# Patient Record
Sex: MALE | Race: WHITE | NOT HISPANIC OR LATINO | Employment: FULL TIME | URBAN - METROPOLITAN AREA
[De-identification: names, ages, dates, MRNs, and addresses within clinical notes are randomized per-mention and may not be internally consistent; named-entity substitution may affect disease eponyms.]

---

## 2023-04-24 ENCOUNTER — OFFICE VISIT (OUTPATIENT)
Dept: FAMILY MEDICINE CLINIC | Facility: CLINIC | Age: 23
End: 2023-04-24
Payer: COMMERCIAL

## 2023-04-24 VITALS
HEIGHT: 67 IN | BODY MASS INDEX: 26.84 KG/M2 | OXYGEN SATURATION: 98 % | DIASTOLIC BLOOD PRESSURE: 72 MMHG | WEIGHT: 171 LBS | RESPIRATION RATE: 18 BRPM | SYSTOLIC BLOOD PRESSURE: 122 MMHG | HEART RATE: 62 BPM

## 2023-04-24 DIAGNOSIS — Z11.3 ROUTINE SCREENING FOR STI (SEXUALLY TRANSMITTED INFECTION): ICD-10-CM

## 2023-04-24 DIAGNOSIS — Z72.0 TOBACCO ABUSE: ICD-10-CM

## 2023-04-24 DIAGNOSIS — F41.9 ANXIETY: ICD-10-CM

## 2023-04-24 DIAGNOSIS — Z13.220 SCREENING, LIPID: ICD-10-CM

## 2023-04-24 DIAGNOSIS — Z13.228 SCREENING FOR METABOLIC DISORDER: ICD-10-CM

## 2023-04-24 DIAGNOSIS — M41.115 JUVENILE IDIOPATHIC SCOLIOSIS OF THORACOLUMBAR REGION: ICD-10-CM

## 2023-04-24 DIAGNOSIS — Z11.59 ENCOUNTER FOR HEPATITIS C SCREENING TEST FOR LOW RISK PATIENT: ICD-10-CM

## 2023-04-24 DIAGNOSIS — Z00.00 PREVENTATIVE HEALTH CARE: Primary | ICD-10-CM

## 2023-04-24 DIAGNOSIS — D22.9 ATYPICAL MOLE: ICD-10-CM

## 2023-04-24 DIAGNOSIS — R00.1 BRADYCARDIA: ICD-10-CM

## 2023-04-24 RX ORDER — BUPROPION HYDROCHLORIDE 100 MG/1
100 TABLET, EXTENDED RELEASE ORAL 2 TIMES DAILY
Qty: 60 TABLET | Refills: 0 | Status: SHIPPED | OUTPATIENT
Start: 2023-04-24

## 2023-04-24 NOTE — PROGRESS NOTES
Subjective   Ariel Myers is a 23 y.o. male.     History of Present Illness   New pt here to estab primary care. Due annual, labs, updated screenings, also has acute concerns     Here for concerns with bradycardia. He was told at clinic when he got tetanus shot his HR was 47.   No syncope, no falls, no h.o seizure. He does have history of murmur. No chest pain, palpitations. HR at rest 58-62.     Chronic tobacco abuse x years, he reports at times it feel hard to take a deep breath. He has tried to cut back, has quit in the past x 7 months. He is interested in trying to quit. History of substance abuse, clean x 3 years    Chronic anxiety x years. He thinks he has ADHD and trouble focusing. Denies SI/HI  PHQ-2 Depression Screening  Little interest or pleasure in doing things? 0-->not at all   Feeling down, depressed, or hopeless? 0-->not at all   PHQ-2 Total Score 0     Chronic scoliosis x years. Chronic back pain, hip pin. Does not wear brace. Does not follow w spine, would like to see PT.     The following portions of the patient's history were reviewed and updated as appropriate: allergies, current medications, past family history, past medical history, past social history, past surgical history and problem list.    Review of Systems   Constitutional: Negative for activity change, diaphoresis, fatigue, fever and unexpected weight loss.   HENT: Negative for congestion.         Awaiting dental exam    Eyes: Negative for visual disturbance.        Glasses, eye exam is due      Respiratory: Negative for cough, shortness of breath and wheezing.    Cardiovascular: Negative for chest pain, palpitations and leg swelling.   Gastrointestinal: Negative for abdominal distention, constipation, diarrhea and GERD.   Endocrine: Negative for cold intolerance, heat intolerance, polydipsia, polyphagia and polyuria.   Genitourinary: Negative for discharge, dysuria, erectile dysfunction, testicular pain and urinary incontinence.    Musculoskeletal: Positive for arthralgias and back pain.   Skin: Negative for bruise.   Allergic/Immunologic: Negative for environmental allergies.   Neurological: Negative for seizures, syncope, weakness and headache.   Hematological: Does not bruise/bleed easily.   Psychiatric/Behavioral: Positive for positive for hyperactivity. Negative for sleep disturbance and depressed mood. The patient is nervous/anxious.        Objective   Physical Exam  Vitals reviewed.   Constitutional:       Appearance: Normal appearance. He is normal weight.   HENT:      Head: Normocephalic.      Right Ear: Tympanic membrane normal.      Left Ear: Tympanic membrane normal.      Nose: Nose normal.      Mouth/Throat:      Mouth: Mucous membranes are moist.   Eyes:      Pupils: Pupils are equal, round, and reactive to light.   Cardiovascular:      Rate and Rhythm: Regular rhythm. Bradycardia present.      Pulses: Normal pulses.      Heart sounds: Murmur heard.   Pulmonary:      Effort: Pulmonary effort is normal.      Breath sounds: Normal breath sounds.   Abdominal:      General: Abdomen is flat. Bowel sounds are normal.      Palpations: Abdomen is soft.   Musculoskeletal:         General: Normal range of motion.      Cervical back: Normal range of motion.        Back:       Comments: Scoliosis    Skin:     General: Skin is warm.   Neurological:      General: No focal deficit present.      Mental Status: He is alert.   Psychiatric:         Mood and Affect: Mood is anxious.         Vitals:    04/24/23 0903   BP: 122/72   Pulse: 62   Resp: 18   SpO2: 98%     Body mass index is 26.78 kg/m².      ECG 12 Lead    Date/Time: 4/24/2023 1:15 PM  Performed by: Dariela Hanley APRN  Authorized by: Dariela Hanley APRN   Comparison: not compared with previous ECG   Previous ECG: no previous ECG available  Rhythm: sinus bradycardia    Clinical impression: abnormal EKG  Comments: Reviewed w Dr Bowens             Assessment & Plan   Problems  Addressed this Visit    None  Visit Diagnoses     Preventative health care    -  Primary    Bradycardia        Relevant Orders    ECG 12 Lead    Screening for metabolic disorder        Relevant Orders    Comprehensive metabolic panel    TSH    CBC w AUTO Differential    Screening, lipid        Relevant Orders    Lipid Panel w/ Chol/HDL Ratio    Encounter for hepatitis C screening test for low risk patient        Routine screening for STI (sexually transmitted infection)        Relevant Orders    Chlamydia trachomatis, Neisseria gonorrhoeae, PCR - Urine, Urine, Random Void    Hepatitis Panel, Acute    HIV-1 / O / 2 Ag / Antibody 4th Generation    RPR, Rfx Qn RPR / Confirm TP    Tobacco abuse        Anxiety        Juvenile idiopathic scoliosis of thoracolumbar region        Relevant Orders    Ambulatory Referral to Physical Therapy    Atypical mole        Relevant Orders    Ambulatory Referral to Dermatology (Completed)      Diagnoses       Codes Comments    Preventative health care    -  Primary ICD-10-CM: Z00.00  ICD-9-CM: V70.0     Bradycardia     ICD-10-CM: R00.1  ICD-9-CM: 427.89     Screening for metabolic disorder     ICD-10-CM: Z13.228  ICD-9-CM: V77.99     Screening, lipid     ICD-10-CM: Z13.220  ICD-9-CM: V77.91     Encounter for hepatitis C screening test for low risk patient     ICD-10-CM: Z11.59  ICD-9-CM: V73.89     Routine screening for STI (sexually transmitted infection)     ICD-10-CM: Z11.3  ICD-9-CM: V74.5     Tobacco abuse     ICD-10-CM: Z72.0  ICD-9-CM: 305.1     Anxiety     ICD-10-CM: F41.9  ICD-9-CM: 300.00     Juvenile idiopathic scoliosis of thoracolumbar region     ICD-10-CM: M41.115  ICD-9-CM: 737.30     Atypical mole     ICD-10-CM: D22.9  ICD-9-CM: 216.9         Orders Placed This Encounter   Procedures   • Chlamydia trachomatis, Neisseria gonorrhoeae, PCR - Urine, Urine, Random Void     Order Specific Question:   Release to patient     Answer:   Routine Release   • Lipid Panel w/ Chol/HDL  Ratio     Order Specific Question:   Release to patient     Answer:   Routine Release   • Comprehensive metabolic panel     Order Specific Question:   Release to patient     Answer:   Routine Release   • TSH     Order Specific Question:   Release to patient     Answer:   Routine Release   • Hepatitis Panel, Acute     Order Specific Question:   Release to patient     Answer:   Routine Release   • HIV-1 / O / 2 Ag / Antibody 4th Generation     Order Specific Question:   Release to patient     Answer:   Routine Release   • RPR, Rfx Qn RPR / Confirm TP     Order Specific Question:   Release to patient     Answer:   Routine Release   • Ambulatory Referral to Physical Therapy     Referral Priority:   Routine     Referral Type:   Physical Therapy     Referral Reason:   Specialty Services Required     Requested Specialty:   Physical Therapy     Number of Visits Requested:   1   • Ambulatory Referral to Dermatology     Referral Priority:   Routine     Referral Type:   Consultation     Referral Reason:   Specialty Services Required     Requested Specialty:   Dermatology     Number of Visits Requested:   1   • ECG 12 Lead     Order Specific Question:   Reason for Exam:     Answer:   h.o murmur and bradycardia   • CBC w AUTO Differential     Order Specific Question:   Manual Differential     Answer:   No       Preventative care- Follow heart healthy diet, drink water, walk daily. Wear seatbelts, wear helmets, wear sunscreens. Follow CDC guidelines for covid pandemic.     Bradycardia-EKG done, reviewed with patient.  If you have syncope or lightheadedness or heart rate consistently running in the 40s to 50s, notify provider.  Slow position changes, stay hydrated     Scoliosis- refer PT    Mole change- refer derm, wear sunscreen    Tobacco use/anxiety-try Wellbutrin 100 SR twice daily, encourage smoking cessation and pick a quit date May call [phone] quit now for additional supports  Reviewed medication side effect profile, ER or  call [phone] for suicidality or worsening symptoms    STI panel collected-reviewed safe sex practices, call with acute concerns       Additional time on acute concerns greater than 24 minutes  Education provided in AVS   Return in about 1 year (around 4/24/2024) for Annual physical.

## 2023-04-26 LAB
ALBUMIN SERPL-MCNC: 4.5 G/DL (ref 4.1–5.2)
ALBUMIN/GLOB SERPL: 2 {RATIO} (ref 1.2–2.2)
ALP SERPL-CCNC: 100 IU/L (ref 44–121)
ALT SERPL-CCNC: 12 IU/L (ref 0–44)
AST SERPL-CCNC: 14 IU/L (ref 0–40)
BASOPHILS # BLD AUTO: 0.1 X10E3/UL (ref 0–0.2)
BASOPHILS NFR BLD AUTO: 1 %
BILIRUB SERPL-MCNC: 0.2 MG/DL (ref 0–1.2)
BUN SERPL-MCNC: 18 MG/DL (ref 6–20)
BUN/CREAT SERPL: 16 (ref 9–20)
C TRACH RRNA SPEC QL NAA+PROBE: NEGATIVE
CALCIUM SERPL-MCNC: 9.9 MG/DL (ref 8.7–10.2)
CHLORIDE SERPL-SCNC: 106 MMOL/L (ref 96–106)
CHOLEST SERPL-MCNC: 145 MG/DL (ref 100–199)
CHOLEST/HDLC SERPL: 3.9 RATIO (ref 0–5)
CO2 SERPL-SCNC: 25 MMOL/L (ref 20–29)
CREAT SERPL-MCNC: 1.13 MG/DL (ref 0.76–1.27)
EGFRCR SERPLBLD CKD-EPI 2021: 93 ML/MIN/1.73
EOSINOPHIL # BLD AUTO: 0.3 X10E3/UL (ref 0–0.4)
EOSINOPHIL NFR BLD AUTO: 3 %
ERYTHROCYTE [DISTWIDTH] IN BLOOD BY AUTOMATED COUNT: 12.6 % (ref 11.6–15.4)
GLOBULIN SER CALC-MCNC: 2.2 G/DL (ref 1.5–4.5)
GLUCOSE SERPL-MCNC: 114 MG/DL (ref 70–99)
HAV IGM SERPL QL IA: NEGATIVE
HBV CORE IGM SERPL QL IA: NEGATIVE
HBV SURFACE AG SERPL QL IA: NEGATIVE
HCT VFR BLD AUTO: 51.1 % (ref 37.5–51)
HCV AB SERPL QL IA: NORMAL
HCV IGG SERPL QL IA: NON REACTIVE
HDLC SERPL-MCNC: 37 MG/DL
HGB BLD-MCNC: 16.9 G/DL (ref 13–17.7)
HIV 1+2 AB+HIV1 P24 AG SERPL QL IA: NON REACTIVE
IMM GRANULOCYTES # BLD AUTO: 0 X10E3/UL (ref 0–0.1)
IMM GRANULOCYTES NFR BLD AUTO: 0 %
LDLC SERPL CALC-MCNC: 94 MG/DL (ref 0–99)
LYMPHOCYTES # BLD AUTO: 2.6 X10E3/UL (ref 0.7–3.1)
LYMPHOCYTES NFR BLD AUTO: 26 %
MCH RBC QN AUTO: 29.5 PG (ref 26.6–33)
MCHC RBC AUTO-ENTMCNC: 33.1 G/DL (ref 31.5–35.7)
MCV RBC AUTO: 89 FL (ref 79–97)
MONOCYTES # BLD AUTO: 0.6 X10E3/UL (ref 0.1–0.9)
MONOCYTES NFR BLD AUTO: 6 %
N GONORRHOEA RRNA SPEC QL NAA+PROBE: NEGATIVE
NEUTROPHILS # BLD AUTO: 6.4 X10E3/UL (ref 1.4–7)
NEUTROPHILS NFR BLD AUTO: 64 %
PLATELET # BLD AUTO: 269 X10E3/UL (ref 150–450)
POTASSIUM SERPL-SCNC: 4.6 MMOL/L (ref 3.5–5.2)
PROT SERPL-MCNC: 6.7 G/DL (ref 6–8.5)
RBC # BLD AUTO: 5.72 X10E6/UL (ref 4.14–5.8)
RPR SER QL: NON REACTIVE
SODIUM SERPL-SCNC: 145 MMOL/L (ref 134–144)
TRIGL SERPL-MCNC: 73 MG/DL (ref 0–149)
TSH SERPL DL<=0.005 MIU/L-ACNC: 2.14 UIU/ML (ref 0.45–4.5)
VLDLC SERPL CALC-MCNC: 14 MG/DL (ref 5–40)
WBC # BLD AUTO: 10 X10E3/UL (ref 3.4–10.8)

## 2023-05-30 RX ORDER — BUPROPION HYDROCHLORIDE 100 MG/1
TABLET, EXTENDED RELEASE ORAL
Qty: 60 TABLET | Refills: 0 | OUTPATIENT
Start: 2023-05-30

## 2023-09-12 ENCOUNTER — OFFICE VISIT (OUTPATIENT)
Dept: FAMILY MEDICINE CLINIC | Facility: CLINIC | Age: 23
End: 2023-09-12
Payer: COMMERCIAL

## 2023-09-12 VITALS
OXYGEN SATURATION: 99 % | BODY MASS INDEX: 26.53 KG/M2 | RESPIRATION RATE: 18 BRPM | DIASTOLIC BLOOD PRESSURE: 70 MMHG | WEIGHT: 169 LBS | SYSTOLIC BLOOD PRESSURE: 110 MMHG | HEART RATE: 72 BPM | HEIGHT: 67 IN

## 2023-09-12 DIAGNOSIS — L30.9 DERMATITIS: ICD-10-CM

## 2023-09-12 PROCEDURE — 99213 OFFICE O/P EST LOW 20 MIN: CPT | Performed by: NURSE PRACTITIONER

## 2023-09-12 RX ORDER — TRIAMCINOLONE ACETONIDE 1 MG/G
1 CREAM TOPICAL 2 TIMES DAILY
Qty: 60 G | Refills: 0 | Status: SHIPPED | OUTPATIENT
Start: 2023-09-12

## 2023-09-12 RX ORDER — CETIRIZINE HYDROCHLORIDE 10 MG/1
10 TABLET ORAL DAILY
Qty: 30 TABLET | Refills: 0 | Status: SHIPPED | OUTPATIENT
Start: 2023-09-12

## 2023-09-12 NOTE — PROGRESS NOTES
Bren Myers is a 23 y.o. male.     Rash   Estab pt here for rash on L side.    Had tick bite in May and pulled off tick on left side. Went to  and rec'd doxy for tick bite 5/22/23. No night sweats, chills or lymphadenopathy. Now has had diffuse rash to L flank and this has been there since tick bite. This is not bullseye rash. +Itches, No relief w topical steroid cream. No changes in soaps detergents.      The following portions of the patient's history were reviewed and updated as appropriate: allergies, current medications, past family history, past medical history, past social history, past surgical history and problem list.    Review of Systems   Skin:  Positive for rash.     Objective   Physical Exam  Vitals reviewed.   Constitutional:       Appearance: Normal appearance.   Cardiovascular:      Rate and Rhythm: Normal rate and regular rhythm.      Pulses: Normal pulses.      Heart sounds: Normal heart sounds.   Pulmonary:      Effort: Pulmonary effort is normal.      Breath sounds: Normal breath sounds.   Skin:     Findings: Rash present.             Comments: Skin colored,r asied sandpaper fine rash to flank and L back   Neurological:      General: No focal deficit present.      Mental Status: He is alert.       Vitals:    09/12/23 1101   BP: 110/70   Pulse: 72   Resp: 18   SpO2: 99%     Body mass index is 26.47 kg/m².    Procedures    Assessment & Plan   Problems Addressed this Visit    None  Visit Diagnoses       Dermatitis              Diagnoses         Codes Comments    Dermatitis     ICD-10-CM: L30.9  ICD-9-CM: 692.9           Contact dermatitis- rx kenalog 1%, zyrtec 10 mg qd, dye free/scent free detergents and soaps      H.o tick bite- completed doxy, reassurance given that rash is not Lyme disease         Education provided in AVS   No follow-ups on file.

## 2024-10-04 ENCOUNTER — HOSPITAL ENCOUNTER (EMERGENCY)
Facility: HOSPITAL | Age: 24
Discharge: HOME | End: 2024-10-04
Attending: EMERGENCY MEDICINE
Payer: COMMERCIAL

## 2024-10-04 ENCOUNTER — APPOINTMENT (OUTPATIENT)
Dept: GENERAL RADIOLOGY | Facility: HOSPITAL | Age: 24
End: 2024-10-04
Payer: COMMERCIAL

## 2024-10-04 VITALS
OXYGEN SATURATION: 97 % | HEART RATE: 111 BPM | RESPIRATION RATE: 20 BRPM | DIASTOLIC BLOOD PRESSURE: 94 MMHG | SYSTOLIC BLOOD PRESSURE: 172 MMHG | TEMPERATURE: 98 F

## 2024-10-04 DIAGNOSIS — R00.2 PALPITATIONS: Primary | ICD-10-CM

## 2024-10-04 LAB
ALBUMIN SERPL-MCNC: 4.2 G/DL (ref 3.5–5.2)
ALBUMIN/GLOB SERPL: 1.4 G/DL
ALP SERPL-CCNC: 115 U/L (ref 39–117)
ALT SERPL W P-5'-P-CCNC: 13 U/L (ref 1–41)
ANION GAP SERPL CALCULATED.3IONS-SCNC: 12.7 MMOL/L (ref 5–15)
AST SERPL-CCNC: 18 U/L (ref 1–40)
BASOPHILS # BLD AUTO: 0.05 10*3/MM3 (ref 0–0.2)
BASOPHILS NFR BLD AUTO: 0.4 % (ref 0–1.5)
BILIRUB SERPL-MCNC: 0.4 MG/DL (ref 0–1.2)
BUN SERPL-MCNC: 12 MG/DL (ref 6–20)
BUN/CREAT SERPL: 9.6 (ref 7–25)
CALCIUM SPEC-SCNC: 9.7 MG/DL (ref 8.6–10.5)
CHLORIDE SERPL-SCNC: 102 MMOL/L (ref 98–107)
CO2 SERPL-SCNC: 23.3 MMOL/L (ref 22–29)
CREAT SERPL-MCNC: 1.25 MG/DL (ref 0.76–1.27)
DEPRECATED RDW RBC AUTO: 38.1 FL (ref 37–54)
EGFRCR SERPLBLD CKD-EPI 2021: 81.4 ML/MIN/1.73
EOSINOPHIL # BLD AUTO: 0.42 10*3/MM3 (ref 0–0.4)
EOSINOPHIL NFR BLD AUTO: 3.6 % (ref 0.3–6.2)
ERYTHROCYTE [DISTWIDTH] IN BLOOD BY AUTOMATED COUNT: 12.4 % (ref 12.3–15.4)
GLOBULIN UR ELPH-MCNC: 3 GM/DL
GLUCOSE SERPL-MCNC: 114 MG/DL (ref 65–99)
HCT VFR BLD AUTO: 47.6 % (ref 37.5–51)
HGB BLD-MCNC: 16.7 G/DL (ref 13–17.7)
HOLD SPECIMEN: NORMAL
HOLD SPECIMEN: NORMAL
IMM GRANULOCYTES # BLD AUTO: 0.04 10*3/MM3 (ref 0–0.05)
IMM GRANULOCYTES NFR BLD AUTO: 0.3 % (ref 0–0.5)
LYMPHOCYTES # BLD AUTO: 3.26 10*3/MM3 (ref 0.7–3.1)
LYMPHOCYTES NFR BLD AUTO: 27.9 % (ref 19.6–45.3)
MAGNESIUM SERPL-MCNC: 1.8 MG/DL (ref 1.6–2.6)
MCH RBC QN AUTO: 29.9 PG (ref 26.6–33)
MCHC RBC AUTO-ENTMCNC: 35.1 G/DL (ref 31.5–35.7)
MCV RBC AUTO: 85.2 FL (ref 79–97)
MONOCYTES # BLD AUTO: 1.07 10*3/MM3 (ref 0.1–0.9)
MONOCYTES NFR BLD AUTO: 9.2 % (ref 5–12)
NEUTROPHILS NFR BLD AUTO: 58.6 % (ref 42.7–76)
NEUTROPHILS NFR BLD AUTO: 6.83 10*3/MM3 (ref 1.7–7)
NRBC BLD AUTO-RTO: 0 /100 WBC (ref 0–0.2)
PLATELET # BLD AUTO: 245 10*3/MM3 (ref 140–450)
PMV BLD AUTO: 9.2 FL (ref 6–12)
POTASSIUM SERPL-SCNC: 4.1 MMOL/L (ref 3.5–5.2)
PROT SERPL-MCNC: 7.2 G/DL (ref 6–8.5)
QT INTERVAL: 309 MS
QTC INTERVAL: 416 MS
RBC # BLD AUTO: 5.59 10*6/MM3 (ref 4.14–5.8)
SODIUM SERPL-SCNC: 138 MMOL/L (ref 136–145)
TROPONIN T SERPL HS-MCNC: <6 NG/L
TSH SERPL DL<=0.05 MIU/L-ACNC: 1.5 UIU/ML (ref 0.27–4.2)
WBC NRBC COR # BLD AUTO: 11.67 10*3/MM3 (ref 3.4–10.8)
WHOLE BLOOD HOLD COAG: NORMAL
WHOLE BLOOD HOLD SPECIMEN: NORMAL

## 2024-10-04 PROCEDURE — 85025 COMPLETE CBC W/AUTO DIFF WBC: CPT | Performed by: EMERGENCY MEDICINE

## 2024-10-04 PROCEDURE — 71045 X-RAY EXAM CHEST 1 VIEW: CPT

## 2024-10-04 PROCEDURE — 80053 COMPREHEN METABOLIC PANEL: CPT | Performed by: EMERGENCY MEDICINE

## 2024-10-04 PROCEDURE — 99284 EMERGENCY DEPT VISIT MOD MDM: CPT

## 2024-10-04 PROCEDURE — 83735 ASSAY OF MAGNESIUM: CPT | Performed by: EMERGENCY MEDICINE

## 2024-10-04 PROCEDURE — 93005 ELECTROCARDIOGRAM TRACING: CPT | Performed by: EMERGENCY MEDICINE

## 2024-10-04 PROCEDURE — 36415 COLL VENOUS BLD VENIPUNCTURE: CPT

## 2024-10-04 PROCEDURE — 84443 ASSAY THYROID STIM HORMONE: CPT | Performed by: EMERGENCY MEDICINE

## 2024-10-04 PROCEDURE — 84484 ASSAY OF TROPONIN QUANT: CPT | Performed by: EMERGENCY MEDICINE

## 2024-10-04 RX ORDER — SODIUM CHLORIDE 0.9 % (FLUSH) 0.9 %
10 SYRINGE (ML) INJECTION AS NEEDED
Status: DISCONTINUED | OUTPATIENT
Start: 2024-10-04 | End: 2024-10-04 | Stop reason: HOSPADM

## 2024-10-04 NOTE — ED PROVIDER NOTES
Time: 5:40 PM EDT  Date of encounter:  10/4/2024  Independent Historian/Clinical History and Information was obtained by:   Patient    History is limited by: N/A    Chief Complaint: palpitations       History of Present Illness:  Patient is a 24 y.o. year old male who presents to the emergency department for evaluation of intermittent palpitations that began 2 days ago.  Patient denies chest pain shortness of breath.  Patient denies headache and vision changes.       Patient Care Team  Primary Care Provider: Provider, No Known    Past Medical History:     Allergies   Allergen Reactions    Amoxicillin Rash    Penicillin G Rash     Past Medical History:   Diagnosis Date    Heart murmur     Scoliosis      Past Surgical History:   Procedure Laterality Date    WISDOM TOOTH EXTRACTION       Family History   Problem Relation Age of Onset    Other Brother         suicide    Heart attack Maternal Grandmother     Heart disease Maternal Grandfather         pacemaker    Heart disease Paternal Grandfather     Heart attack Maternal Great-Grandfather         age 37       Home Medications:  Prior to Admission medications    Medication Sig Start Date End Date Taking? Authorizing Provider   azithromycin (Zithromax Z-Raoul) 250 MG tablet Take 2 tablets by mouth on day 1, then 1 tablet daily on days 2-5 10/2/24   Missael Figueroa MD   methylPREDNISolone (MEDROL) 4 MG dose pack Take as directed on package instructions. 10/2/24   Missael Figueroa MD        Social History:   Social History     Tobacco Use    Smoking status: Every Day     Average packs/day: 0.5 packs/day for 12.0 years (6.0 ttl pk-yrs)     Types: Cigarettes     Start date: 4/3/2000    Smokeless tobacco: Former     Types: Snuff    Tobacco comments:     Since age 10    Vaping Use    Vaping status: Never Used   Substance Use Topics    Alcohol use: Yes     Comment: rarely    Drug use: Yes     Types: Marijuana         Review of Systems:  Review of Systems    Constitutional:  Negative for chills and fever.   HENT:  Negative for congestion, rhinorrhea and sore throat.    Eyes:  Negative for pain and visual disturbance.   Respiratory:  Negative for apnea, cough, chest tightness and shortness of breath.    Cardiovascular:  Positive for palpitations. Negative for chest pain.   Gastrointestinal:  Negative for abdominal pain, diarrhea, nausea and vomiting.   Genitourinary:  Negative for difficulty urinating and dysuria.   Musculoskeletal:  Negative for joint swelling and myalgias.   Skin:  Negative for color change.   Neurological:  Negative for seizures and headaches.   Psychiatric/Behavioral: Negative.     All other systems reviewed and are negative.       Physical Exam:  /94 (BP Location: Left arm, Patient Position: Lying)   Pulse 111   Temp 98 °F (36.7 °C) (Oral)   Resp 20   SpO2 97%     Physical Exam  Vitals and nursing note reviewed.   Constitutional:       General: He is not in acute distress.     Appearance: Normal appearance. He is not toxic-appearing.   HENT:      Head: Normocephalic and atraumatic.      Jaw: There is normal jaw occlusion.   Eyes:      General: Lids are normal.      Extraocular Movements: Extraocular movements intact.      Conjunctiva/sclera: Conjunctivae normal.      Pupils: Pupils are equal, round, and reactive to light.   Cardiovascular:      Rate and Rhythm: Tachycardia present.      Pulses: Normal pulses.      Heart sounds: Normal heart sounds.   Pulmonary:      Effort: Pulmonary effort is normal. No respiratory distress.      Breath sounds: Normal breath sounds. No wheezing or rhonchi.   Abdominal:      General: Abdomen is flat.      Palpations: Abdomen is soft.      Tenderness: There is no abdominal tenderness. There is no guarding or rebound.   Musculoskeletal:         General: Normal range of motion.      Cervical back: Normal range of motion and neck supple.      Right lower leg: No edema.      Left lower leg: No edema.    Skin:     General: Skin is warm and dry.   Neurological:      Mental Status: He is alert and oriented to person, place, and time. Mental status is at baseline.   Psychiatric:         Mood and Affect: Mood normal.                  Procedures:  Procedures      Medical Decision Making:      Comorbidities that affect care:    None    External Notes reviewed:          The following orders were placed and all results were independently analyzed by me:  Orders Placed This Encounter   Procedures    XR Chest 1 View    [place] Draw    Comprehensive Metabolic Panel    Magnesium    Single High Sensitivity Troponin T    TSH    CBC Auto Differential    Ambulatory Referral to Cardiology    NPO Diet NPO Type: Strict NPO    Undress & Gown    Continuous Pulse Oximetry    Oxygen Therapy- Nasal Cannula; Titrate 1-6 LPM Per SpO2; 90 - 95%    ECG 12 Lead ED Triage Standing Order; Dysrhythmia    Insert Peripheral IV    CBC & Differential    Green Top (Gel)    Lavender Top    Gold Top - SST    Light Blue Top       Medications Given in the Emergency Department:  Medications   sodium chloride 0.9 % flush 10 mL (has no administration in time range)        ED Course:         Labs:    Lab Results (last 24 hours)       Procedure Component Value Units Date/Time    CBC & Differential [[id number]]  (Abnormal) Collected: 10/04/24 1626    Specimen: Blood Updated: 10/04/24 1633    Narrative:      The following orders were created for panel order CBC & Differential.  Procedure                               Abnormality         Status                     ---------                               -----------         ------                     CBC Auto Differential[[id number]]        Abnormal            Final result                 Please view results for these tests on the individual orders.    Comprehensive Metabolic Panel [[id number]]  (Abnormal) Collected: 10/04/24 1626    Specimen: Blood Updated: 10/04/24 1656     Glucose 114 mg/dL      BUN 12 mg/dL       Creatinine 1.25 mg/dL      Sodium 138 mmol/L      Potassium 4.1 mmol/L      Chloride 102 mmol/L      CO2 23.3 mmol/L      Calcium 9.7 mg/dL      Total Protein 7.2 g/dL      Albumin 4.2 g/dL      ALT (SGPT) 13 U/L      AST (SGOT) 18 U/L      Alkaline Phosphatase 115 U/L      Total Bilirubin 0.4 mg/dL      Globulin 3.0 gm/dL      A/G Ratio 1.4 g/dL      BUN/Creatinine Ratio 9.6     Anion Gap 12.7 mmol/L      eGFR 81.4 mL/min/1.73     Narrative:      GFR Normal >60  Chronic Kidney Disease <60  Kidney Failure <15      Magnesium [[id number]]  (Normal) Collected: 10/04/24 1626    Specimen: Blood Updated: 10/04/24 1656     Magnesium 1.8 mg/dL     Single High Sensitivity Troponin T [[id number]]  (Normal) Collected: 10/04/24 1626    Specimen: Blood Updated: 10/04/24 1659     HS Troponin T <6 ng/L     Narrative:      High Sensitive Troponin T Reference Range:  <14.0 ng/L- Negative Female for AMI  <22.0 ng/L- Negative Male for AMI  >=14 - Abnormal Female indicating possible myocardial injury.  >=22 - Abnormal Male indicating possible myocardial injury.   Clinicians would have to utilize clinical acumen, EKG, Troponin, and serial changes to determine if it is an Acute Myocardial Infarction or myocardial injury due to an underlying chronic condition.         TSH [[id number]]  (Normal) Collected: 10/04/24 1626    Specimen: Blood Updated: 10/04/24 1659     TSH 1.500 uIU/mL     CBC Auto Differential [[id number]]  (Abnormal) Collected: 10/04/24 1626    Specimen: Blood Updated: 10/04/24 1633     WBC 11.67 10*3/mm3      RBC 5.59 10*6/mm3      Hemoglobin 16.7 g/dL      Hematocrit 47.6 %      MCV 85.2 fL      MCH 29.9 pg      MCHC 35.1 g/dL      RDW 12.4 %      RDW-SD 38.1 fl      MPV 9.2 fL      Platelets 245 10*3/mm3      Neutrophil % 58.6 %      Lymphocyte % 27.9 %      Monocyte % 9.2 %      Eosinophil % 3.6 %      Basophil % 0.4 %      Immature Grans % 0.3 %      Neutrophils, Absolute 6.83 10*3/mm3      Lymphocytes, Absolute 3.26  10*3/mm3      Monocytes, Absolute 1.07 10*3/mm3      Eosinophils, Absolute 0.42 10*3/mm3      Basophils, Absolute 0.05 10*3/mm3      Immature Grans, Absolute 0.04 10*3/mm3      nRBC 0.0 /100 WBC              Imaging:    XR Chest 1 View    Result Date: 10/4/2024  XR CHEST 1 VW Date of Exam: 10/4/2024 4:42 PM EDT Indication: Dysrhythmia Triage Protocol Comparison: None available. Findings: Cardiomediastinal silhouette is unremarkable.  No airspace disease, pneumothorax, nor pleural effusion. No acute osseous abnormality identified. There is a small round metallic density in the left upper thorax, which has an appearance of a BB.     Impression: No acute cardiopulmonary abnormality. Electronically Signed: Edna Horan MD  10/4/2024 4:47 PM EDT  Workstation ID: [id number]       Differential Diagnosis and Discussion:    Chest Pain:  Based on the patient's signs and symptoms, I considered aortic dissection, myocardial infaction, pulmonary embolism, cardiac tamponade, pericarditis, pneumothorax, musculoskeletal chest pain and other differential diagnosis as an etiology of the patient's chest pain.     All labs were reviewed and interpreted by me.  All X-rays impressions were independently interpreted by me.  EKG was interpreted by supervising attending.    MDM       Troponin within normal limits.  TSH baseline within normal limits.  Patient's heart rate has come down to 86 and is sinus.  Patient's oxygen saturation is 97% on room air.  Patient denies chest pain shortness of breath.  Chest x-ray shows no acute cardiopulmonary abnormality.  Patient is afebrile.  I will provide an ambulatory referral to cardiology for follow-up.  Instructed patient to return to ED if develops any new or worsening symptoms.  Patient states he understands and agrees with plan of care.              Patient Care Considerations:          Consultants/Shared Management Plan:    None    Social Determinants of Health:    Patient is independent,  reliable, and has access to care.       Disposition and Care Coordination:    Discharged: The patient is suitable and stable for discharge with no need for consideration of admission.    I have explained the patient´s condition, diagnoses and treatment plan based on the information available to me at this time. I have answered questions and addressed any concerns. The patient has a good  understanding of the patient´s diagnosis, condition, and treatment plan as can be expected at this point. The vital signs have been stable. The patient´s condition is stable and appropriate for discharge from the emergency department.      The patient will pursue further outpatient evaluation with the primary care physician or other designated or consulting physician as outlined in the discharge instructions. They are agreeable to this plan of care and follow-up instructions have been explained in detail. The patient has received these instructions in written format and has expressed an understanding of the discharge instructions. The patient is aware that any significant change in condition or worsening of symptoms should prompt an immediate return to this or the closest emergency department or call to 911.  I have explained discharge medications and the need for follow up with the patient/caretakers. This was also printed in the discharge instructions. Patient was discharged with the following medications and follow up:      Medication List      No changes were made to your prescriptions during this visit.      Arthur Montelongo MD  [address] [facility]  [place] [address]  [phone]    Call in 1 day  To schedule follow-up       Final diagnoses:   Palpitations        ED Disposition       ED Disposition   Discharge    Condition   Stable    Comment   --               This medical record created using voice recognition software.             Modesto Hercules PA-C  10/04/24 8832

## 2024-10-11 ENCOUNTER — OFFICE VISIT (OUTPATIENT)
Dept: CARDIOLOGY | Facility: CLINIC | Age: 24
End: 2024-10-11
Payer: COMMERCIAL

## 2024-10-11 VITALS
HEIGHT: 68 IN | HEART RATE: 40 BPM | WEIGHT: 178 LBS | SYSTOLIC BLOOD PRESSURE: 121 MMHG | DIASTOLIC BLOOD PRESSURE: 76 MMHG | BODY MASS INDEX: 26.98 KG/M2

## 2024-10-11 DIAGNOSIS — R07.89 CHEST PAIN, ATYPICAL: Primary | ICD-10-CM

## 2024-10-11 DIAGNOSIS — R00.2 PALPITATIONS: ICD-10-CM

## 2024-10-11 DIAGNOSIS — F17.200 SMOKER: ICD-10-CM

## 2024-10-11 PROCEDURE — 99204 OFFICE O/P NEW MOD 45 MIN: CPT | Performed by: INTERNAL MEDICINE

## 2024-10-11 NOTE — PROGRESS NOTES
Chief Complaint  Palpitations    Bren Myers presents to [facility] CARDIOLOGY  Palpitations   Associated symptoms include chest pain, dizziness and malaise/fatigue.       24-year-old white male.  He has no previous cardiac history.  He went to the emergency room recently after feeling ill and feeling like he was having some tingling in his heart or palpitations.  His workup there was negative.  His EKG did show sinus tachycardia at a rate of 109 but otherwise was normal.  He was using a lot of energy drinks to that point.  He tested negative for COVID and strep.  Over the past week he has felt somewhat better.  He has had some dizziness as well.    PMH  Past Medical History:   Diagnosis Date    Heart murmur     Scoliosis          SURGICALHX  Past Surgical History:   Procedure Laterality Date    WISDOM TOOTH EXTRACTION          SOC  Social History     Socioeconomic History    Marital status: Single    Number of children: 0   Tobacco Use    Smoking status: Every Day     Average packs/day: 0.5 packs/day for 12.0 years (6.0 ttl pk-yrs)     Types: Cigarettes     Start date: 4/3/2000    Smokeless tobacco: Former     Types: Snuff    Tobacco comments:     Since age 10    Vaping Use    Vaping status: Never Used   Substance and Sexual Activity    Alcohol use: Yes     Comment: rarely    Drug use: Yes     Types: Marijuana    Sexual activity: Yes     Partners: Female         FAMHX  Family History   Problem Relation Age of Onset    Other Brother         suicide    Heart attack Maternal Grandmother     Heart disease Maternal Grandfather         pacemaker    Heart disease Paternal Grandfather     Heart attack Maternal Great-Grandfather         age 37          ALLERGY  Allergies   Allergen Reactions    Amoxicillin Rash    Penicillin G Rash        MEDSCURRENT    Current Outpatient Medications:     azithromycin (Zithromax Z-Raoul) 250 MG tablet, Take 2 tablets by mouth on day 1, then 1 tablet  "daily on days 2-5 (Patient not taking: Reported on 10/11/2024), Disp: 6 tablet, Rfl: 0    methylPREDNISolone (MEDROL) 4 MG dose pack, Take as directed on package instructions. (Patient not taking: Reported on 10/11/2024), Disp: 21 tablet, Rfl: 0      Review of Systems   Constitutional: Positive for malaise/fatigue.   HENT: Negative.     Eyes: Negative.    Cardiovascular:  Positive for chest pain and palpitations.   Respiratory: Negative.     Endocrine: Negative.    Hematologic/Lymphatic: Negative.    Skin: Negative.    Musculoskeletal: Negative.    Gastrointestinal: Negative.    Genitourinary: Negative.    Neurological:  Positive for dizziness.   Psychiatric/Behavioral: Negative.          Objective     /76   Pulse (!) 40   Ht 172.7 cm (68\")   Wt 80.7 kg (178 lb)   BMI 27.06 kg/m²       General Appearance:   well developed  well nourished  HENT:   oropharynx moist  lips not cyanotic  Neck:  thyroid not enlarged  supple  Respiratory:  no respiratory distress  normal breath sounds  no rales  Cardiovascular:  no jugular venous distention  regular rhythm, bradycardic  apical impulse normal  S1 normal, S2 normal  no S3, no S4   no murmur  no rub, no thrill  carotid pulses normal; no bruit  pedal pulses normal  lower extremity edema: none    Musculoskeletal:  no clubbing of fingers.   normocephalic, head atraumatic  Skin:   warm, dry  Psychiatric:  judgement and insight appropriate  normal mood and affect      Result Review :     The following data was reviewed by: Gerard Schrieber MD on 10/11/2024:    CMP          10/4/2024    16:26   CMP   Glucose 114    BUN 12    Creatinine 1.25    EGFR 81.4    Sodium 138    Potassium 4.1    Chloride 102    Calcium 9.7    Total Protein 7.2    Albumin 4.2    Globulin 3.0    Total Bilirubin 0.4    Alkaline Phosphatase 115    AST (SGOT) 18    ALT (SGPT) 13    Albumin/Globulin Ratio 1.4    BUN/Creatinine Ratio 9.6    Anion Gap 12.7      CBC          10/4/2024    16:26 "   CBC   WBC 11.67    RBC 5.59    Hemoglobin 16.7    Hematocrit 47.6    MCV 85.2    MCH 29.9    MCHC 35.1    RDW 12.4    Platelets 245        TSH          10/4/2024    16:26   TSH   TSH 1.500        Data reviewed : Primary care records reviewed, emergency room records reviewed, EKG reviewed with no previous for comparison, labs reviewed      Procedures      Ariel Myers  reports that he has been smoking cigarettes. He started smoking about 24 years ago. He has a 6 pack-year smoking history. He has quit using smokeless tobacco.  His smokeless tobacco use included snuff. I have educated him on the risk of diseases from using tobacco products such as cancer, COPD, and heart disease.     I advised him to quit and he is willing to quit. We have discussed the following method/s for tobacco cessation:  Counseling.  Together we have set a quit date for  when he weans out of 0 cigarettes .  He will follow up with me in  as needed   in the future  or sooner to check on his progress.    I spent 3  minutes counseling the patient.                Assessment and Plan        ASSESSMENT:  Encounter Diagnoses   Name Primary?    Chest pain, atypical Yes    Palpitations     Smoker          PLAN:    1.  Atypical palpitations and chest discomfort-baseline EKG showed sinus tachycardia otherwise his laboratory workup was negative.  A 3-day Holter monitor and treadmill test will be scheduled.  2.  Smoker-counseled  3.  I instructed him to reduce caffeine use and try to drink more water.  We will discuss the diagnostic results when available otherwise he will be followed as needed          Patient was given instructions and counseling regarding his condition or for health maintenance advice. Please see specific information pulled into the AVS if appropriate.             MATTHEW Schreiber MD  10/11/2024    10:50 EDT

## 2024-10-17 ENCOUNTER — OFFICE VISIT (OUTPATIENT)
Dept: FAMILY MEDICINE CLINIC | Facility: CLINIC | Age: 24
End: 2024-10-17
Payer: COMMERCIAL

## 2024-10-17 VITALS
OXYGEN SATURATION: 98 % | WEIGHT: 181.9 LBS | SYSTOLIC BLOOD PRESSURE: 130 MMHG | BODY MASS INDEX: 27.57 KG/M2 | HEIGHT: 68 IN | HEART RATE: 51 BPM | TEMPERATURE: 98.3 F | DIASTOLIC BLOOD PRESSURE: 76 MMHG

## 2024-10-17 DIAGNOSIS — R21 RASH: ICD-10-CM

## 2024-10-17 DIAGNOSIS — Z00.01 ENCOUNTER FOR WELL ADULT EXAM WITH ABNORMAL FINDINGS: Primary | ICD-10-CM

## 2024-10-17 DIAGNOSIS — K13.70 MOUTH LESION: ICD-10-CM

## 2024-10-17 DIAGNOSIS — F17.200 TOBACCO USE DISORDER: ICD-10-CM

## 2024-10-17 DIAGNOSIS — L72.0 MILIA: ICD-10-CM

## 2024-10-17 DIAGNOSIS — M41.9 SCOLIOSIS OF THORACIC SPINE, UNSPECIFIED SCOLIOSIS TYPE: ICD-10-CM

## 2024-10-17 PROCEDURE — 99395 PREV VISIT EST AGE 18-39: CPT | Performed by: STUDENT IN AN ORGANIZED HEALTH CARE EDUCATION/TRAINING PROGRAM

## 2024-10-17 RX ORDER — NICOTINE 21 MG/24HR
1 PATCH, TRANSDERMAL 24 HOURS TRANSDERMAL EVERY 24 HOURS
Qty: 42 PATCH | Refills: 0 | Status: SHIPPED | OUTPATIENT
Start: 2024-10-17

## 2024-10-17 NOTE — PROGRESS NOTES
Chief Complaint  Annual Exam    Subjective      Ariel Myers is a 24 y.o. male who presents to [facility]     Last health maintenance visit: 1 year ago  Lifestyle:  Attempting to lose weight?: no  Diet: does not eat a lot of vegetables, fast food almost every day  Exercise: just at work, not outside of work  Tobacco: Regular use (~ 1/4 pack/day)   Alcohol: does not drink  Work: [employer],   Depression Screenin  Sexually active: no    Acute complaints:     History of Present Illness  The patient is a 24-year-old male who presents for a wellness visit.    He has a history of a heart murmur, diagnosed in his youth, which occasionally caused discomfort during strenuous activities such as wrestling. He is currently under the care of a cardiologist, Dr. Schreiber, and is awaiting insurance approval for a 3-day heart monitor and stress test. He has been advised to limit sodium intake due to elevated blood pressure.  He does have instances of palpitations which he discussed with Dr. Schreiber.    He experienced scoliosis as a child.  This currently is not an issue for him.    He experiences occasional rashes on his stomach, which are itchy but resolve spontaneously. He does not take any medication for these rashes. 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 Despite washing his face twice daily, the bumps persist.    He is a current smoker, having started at age 10. He has recently reduced his smoking from a pack a day to less than half a pack a week. He typically smokes upon waking and during breaks at work. He has tried medication to quit smoking but did not find it helpful. He is open to trying nicotine patches or gum. He experiences cravings when around other smokers and has noticed that smoking sometimes makes him feel unwell.    He has noticed small, non-painful bumps on the roof of his mouth for  "several months. These bumps can cause discomfort when pressed with his tongue. He reports no recent mouth injuries or consumption of sharp foods. He occasionally gets canker sores from biting his lip.      Objective   Vital Signs:   Vitals:    10/17/24 0935   BP: 130/76   BP Location: Right arm   Patient Position: Sitting   Pulse: 51   Temp: 98.3 °F (36.8 °C)   TempSrc: Oral   SpO2: 98%   Weight: 82.5 kg (181 lb 14.4 oz)   Height: 172.7 cm (68\")     Body mass index is 27.66 kg/m².    Wt Readings from Last 3 Encounters:   10/17/24 82.5 kg (181 lb 14.4 oz)   10/11/24 80.7 kg (178 lb)   10/02/24 79.9 kg (176 lb 3.2 oz)     BP Readings from Last 3 Encounters:   10/17/24 130/76   10/11/24 121/76   10/04/24 172/94       Health Maintenance   Topic Date Due    Pneumococcal Vaccine 0-64 (1 of 2 - PCV) Never done    ANNUAL PHYSICAL  04/24/2024    COVID-19 Vaccine (1 - 2023-24 season) 10/19/2024 (Originally 9/1/2024)    INFLUENZA VACCINE  03/31/2025 (Originally 8/1/2024)    BMI FOLLOWUP  10/11/2025    TDAP/TD VACCINES (3 - Td or Tdap) 04/11/2033    HEPATITIS C SCREENING  Completed       Physical Exam  HENT:      Head: Normocephalic and atraumatic.      Nose: Nose normal.      Mouth/Throat:      Mouth: Mucous membranes are moist.      Comments: 2 small white pinpoint papules noted on the roof of patient's mouth  Eyes:      Extraocular Movements: Extraocular movements intact.      Conjunctiva/sclera: Conjunctivae normal.   Cardiovascular:      Rate and Rhythm: Regular rhythm. Bradycardia present.      Heart sounds: No murmur heard.     No friction rub. No gallop.   Pulmonary:      Effort: No respiratory distress.      Breath sounds: No wheezing, rhonchi or rales.   Abdominal:      General: Abdomen is flat. There is no distension.   Musculoskeletal:         General: No swelling.      Cervical back: Neck supple.      Comments: On gross physical exam, it appears the patient has dextroscoliosis.   Skin:     General: Skin is warm and " dry.   Neurological:      General: No focal deficit present.      Mental Status: He is alert and oriented to person, place, and time.   Psychiatric:         Mood and Affect: Mood normal.         Behavior: Behavior normal.         Thought Content: Thought content normal.         Judgment: Judgment normal.          Physical Exam  Vital Signs  Heart rate is 51 today.      Procedures          Diagnoses and all orders for this visit:    1. Well adult exam (Primary)    2. Tobacco use disorder  -     nicotine (EQ Nicotine) 14 MG/24HR patch; Place 1 patch on the skin as directed by provider Daily.  Dispense: 42 patch; Refill: 0    3. Mouth lesion    4. Rash    5. Milia    6. Scoliosis of lumbar spine, unspecified scoliosis type         Assessment & Plan  1. Heart Murmur.  He has a history of a heart murmur since childhood, which occasionally causes discomfort during intense physical activity. He is currently under the care of a cardiologist, Dr. Schreiber, who plans to monitor his heart with a 3-day heart monitor and conduct a stress test. His heart rate was 51 today, which is consistent with his usual rate of 40s-50s, except for an episode of 111 bpm during a recent illness where he went to the ER.  Records reviewed.    2. Smoking Cessation.  He is currently smoking less than a quarter pack a day, down from a pack a day. A medium dose nicotine patch will be prescribed for 1 to 1.5 months to help with smoking cessation. If effective, the dosage will be gradually reduced. If ineffective, alternative strategies will be discussed. Nicotine gum and lozenges were also discussed as options.    3. Anxiety.  He experiences significant anxiety, which he believes contributed to his elevated heart rate during a recent illness. Nicotine use may also exacerbate his anxiety symptoms. Smoking cessation efforts may help reduce anxiety.    4. Oral Lesions.  He has small, non-painful bumps on the roof of his mouth that have been present for a  few months. These do not appear concerning but will be monitored for any changes. He was advised to consult a dentist for further evaluation if the bumps grow or change.    5. Scoliosis.  He has a history of scoliosis with a curvature of 35 degrees, last evaluated three years ago. He experiences no pain but is concerned about the appearance of his rib cage. No immediate intervention is required, but he will continue to monitor for any changes.  Review of x-ray from 9/9/2021 shows rotary dextroscoliosis at T11-T12 approximately 13 degrees    6. Skin Rash.  He experiences recurrent, itchy rashes on his groin and stomach, which he describes as small red bumps. Hydrocortisone cream or over-the-counter steroid cream was recommended for treatment. If the rash persists or worsens, further evaluation will be necessary.    7. Health Maintenance.  He was offered a flu shot and COVID booster, both of which he declined. 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 A pneumonia vaccine was recommended due to his smoking status.  We will hold off on PCV today as patient is trying to quit smoking.  Will not obtain labs today as CMP and CBC was received in October 2024.  Reviewed labs from last year from April 2023 which showed negative lipid panel, negative thyroid, negative STD panel.  Currently no indication to test for diabetes based on patient's age.    Follow-up  Return in 2 months for follow-up on smoking cessation.              FOLLOW UP  Return in about 1 month (around 11/17/2024) for smoking cessation.  Patient was given instructions and counseling regarding his condition or for health maintenance advice. Please see specific information pulled into the AVS if appropriate.     Patient or patient representative verbalized consent for the use of Ambient Listening during the visit with  Severo Cullen DO for chart documentation. 10/17/2024  10:50 EDT    Severo Cullen  DO  10/17/24  10:49 EDT    CURRENT & DISCONTINUED MEDICATIONS  Current Outpatient Medications   Medication Instructions    nicotine (EQ Nicotine) 14 MG/24HR patch 1 patch, Transdermal, Every 24 Hours       Medications Discontinued During This Encounter   Medication Reason    azithromycin (Zithromax Z-Raoul) 250 MG tablet     methylPREDNISolone (MEDROL) 4 MG dose pack

## 2024-10-27 LAB
QT INTERVAL: 309 MS
QTC INTERVAL: 416 MS

## 2024-11-05 ENCOUNTER — HOSPITAL ENCOUNTER (OUTPATIENT)
Dept: CARDIOLOGY | Facility: HOSPITAL | Age: 24
Discharge: HOME | End: 2024-11-05
Admitting: INTERNAL MEDICINE
Payer: COMMERCIAL

## 2024-11-05 DIAGNOSIS — R07.89 CHEST PAIN, ATYPICAL: ICD-10-CM

## 2024-11-05 PROCEDURE — 93017 CV STRESS TEST TRACING ONLY: CPT

## 2024-11-06 ENCOUNTER — TELEPHONE (OUTPATIENT)
Dept: CARDIOLOGY | Facility: CLINIC | Age: 24
End: 2024-11-06
Payer: COMMERCIAL

## 2024-11-06 LAB
BH CV IMMEDIATE POST RECOVERY TECH DATA SYMPTOMS: NORMAL
BH CV IMMEDIATE POST TECH DATA BLOOD PRESSURE: NORMAL MMHG
BH CV IMMEDIATE POST TECH DATA HEART RATE: 154 BPM
BH CV IMMEDIATE POST TECH DATA OXYGEN SATS: 96 %
BH CV NINE MINUTE RECOVERY TECH DATA BLOOD PRESSURE: NORMAL MMHG
BH CV NINE MINUTE RECOVERY TECH DATA HEART RATE: 92 BPM
BH CV NINE MINUTE RECOVERY TECH DATA SYMPTOMS: NORMAL
BH CV SIX MINUTE RECOVERY TECH DATA BLOOD PRESSURE: NORMAL
BH CV SIX MINUTE RECOVERY TECH DATA HEART RATE: 98 BPM
BH CV SIX MINUTE RECOVERY TECH DATA OXYGEN SATURATION: 97 %
BH CV SIX MINUTE RECOVERY TECH DATA SYMPTOMS: NORMAL
BH CV STRESS BP STAGE 1: NORMAL
BH CV STRESS BP STAGE 2: NORMAL
BH CV STRESS BP STAGE 3: NORMAL
BH CV STRESS BP STAGE 4: NORMAL
BH CV STRESS DURATION MIN STAGE 1: 3
BH CV STRESS DURATION MIN STAGE 2: 3
BH CV STRESS DURATION MIN STAGE 3: 3
BH CV STRESS DURATION MIN STAGE 4: 3
BH CV STRESS DURATION SEC STAGE 1: 0
BH CV STRESS DURATION SEC STAGE 2: 0
BH CV STRESS DURATION SEC STAGE 3: 0
BH CV STRESS DURATION SEC STAGE 4: 0
BH CV STRESS DURATION SEC STAGE 5: 30
BH CV STRESS GRADE STAGE 1: 10
BH CV STRESS GRADE STAGE 2: 12
BH CV STRESS GRADE STAGE 3: 14
BH CV STRESS GRADE STAGE 4: 16
BH CV STRESS GRADE STAGE 5: 18
BH CV STRESS HR STAGE 1: 101
BH CV STRESS HR STAGE 2: 104
BH CV STRESS HR STAGE 3: 117
BH CV STRESS HR STAGE 4: 171
BH CV STRESS HR STAGE 5: 179
BH CV STRESS METS STAGE 1: 5
BH CV STRESS METS STAGE 2: 7.5
BH CV STRESS METS STAGE 3: 10
BH CV STRESS METS STAGE 4: 13.5
BH CV STRESS METS STAGE 5: 15
BH CV STRESS O2 STAGE 1: 97
BH CV STRESS O2 STAGE 2: 98
BH CV STRESS O2 STAGE 3: 98
BH CV STRESS O2 STAGE 4: 94
BH CV STRESS O2 STAGE 5: 94
BH CV STRESS PROTOCOL 1: NORMAL
BH CV STRESS RECOVERY BP: NORMAL MMHG
BH CV STRESS RECOVERY HR: 92 BPM
BH CV STRESS SPEED STAGE 1: 1.7
BH CV STRESS SPEED STAGE 2: 2.5
BH CV STRESS SPEED STAGE 3: 3.4
BH CV STRESS SPEED STAGE 4: 4.2
BH CV STRESS SPEED STAGE 5: 5
BH CV STRESS STAGE 1: 1
BH CV STRESS STAGE 2: 2
BH CV STRESS STAGE 3: 3
BH CV STRESS STAGE 4: 4
BH CV STRESS STAGE 5: 5
BH CV THREE MINUTE POST TECH DATA BLOOD PRESSURE: NORMAL MMHG
BH CV THREE MINUTE POST TECH DATA HEART RATE: 120 BPM
BH CV THREE MINUTE POST TECH DATA OXYGEN SATURATION: 96 %
BH CV THREE MINUTE RECOVERY TECH DATA SYMPTOM: NORMAL
MAXIMAL PREDICTED HEART RATE: 194 BPM
PERCENT MAX PREDICTED HR: 92.27 %
STRESS BASELINE BP: NORMAL MMHG
STRESS BASELINE HR: 82 BPM
STRESS O2 SAT REST: 98 %
STRESS PERCENT HR: 109 %
STRESS POST ESTIMATED WORKLOAD: 13.8 METS
STRESS POST EXERCISE DUR MIN: 12 MIN
STRESS POST EXERCISE DUR SEC: 30 SEC
STRESS POST O2 SAT PEAK: 94 %
STRESS POST PEAK BP: NORMAL MMHG
STRESS POST PEAK HR: 179 BPM
STRESS TARGET HR: 165 BPM

## 2024-11-06 NOTE — TELEPHONE ENCOUNTER
----- Message from Daksha GORMAN sent at 11/6/2024 12:08 PM EST -----    ----- Message -----  From: MATTHEW Schreiber MD  Sent: 11/6/2024  11:49 AM EST  To: Daksha Hendrix RN    Treadmill test was normal, excellent functional capacity, normal heart rate response, no EKG changes or arrhythmias    Await Holter

## 2024-12-09 ENCOUNTER — OFFICE VISIT (OUTPATIENT)
Dept: FAMILY MEDICINE CLINIC | Facility: CLINIC | Age: 24
End: 2024-12-09
Payer: COMMERCIAL

## 2024-12-09 VITALS
WEIGHT: 175.8 LBS | HEART RATE: 50 BPM | BODY MASS INDEX: 26.64 KG/M2 | HEIGHT: 68 IN | OXYGEN SATURATION: 98 % | DIASTOLIC BLOOD PRESSURE: 62 MMHG | TEMPERATURE: 98.2 F | SYSTOLIC BLOOD PRESSURE: 132 MMHG

## 2024-12-09 DIAGNOSIS — F17.200 TOBACCO USE DISORDER: Primary | ICD-10-CM

## 2024-12-09 DIAGNOSIS — R00.2 PALPITATIONS: ICD-10-CM

## 2024-12-09 DIAGNOSIS — R01.1 SYSTOLIC MURMUR: ICD-10-CM

## 2024-12-09 PROCEDURE — 99213 OFFICE O/P EST LOW 20 MIN: CPT | Performed by: STUDENT IN AN ORGANIZED HEALTH CARE EDUCATION/TRAINING PROGRAM

## 2024-12-09 NOTE — PROGRESS NOTES
"Chief Complaint  Follow-up (Smoking, did not pickup patches.)    Subjective      Ariel Myers is a 24 y.o. male who presents to [facility]     History of Present Illness  The patient is a 24-year-old male who presents for a follow-up on smoking cessation.    He continues to smoke, although less frequently than before. He has not yet started using the prescribed patches due to forgetfulness. He rates his motivation to quit smoking as 10 out of 10 and acknowledges that smoking is affecting his respiratory health, causing him to become breathless more quickly during physical activity. He expresses a strong desire to quit smoking entirely and uses candy as a tool to manage his cravings. He has a history of smoking since childhood.    He has not yet received his heart monitor, but his recent stress test results were normal. He reports no episodes of rapid heart rate.    He believes he may have contracted a virus, which he feels has impacted his overall health. Despite being in close contact with a coworker who tested positive for COVID-19, he did not contract the virus himself.    SOCIAL HISTORY  The patient is a smoker.    FAMILY HISTORY  His mother had COPD.       Objective   Vital Signs:   Vitals:    12/09/24 0956   BP: 132/62   BP Location: Left arm   Patient Position: Sitting   Pulse: 50   Temp: 98.2 °F (36.8 °C)   TempSrc: Oral   SpO2: 98%   Weight: 79.7 kg (175 lb 12.8 oz)   Height: 172.7 cm (68\")     Body mass index is 26.73 kg/m².    Wt Readings from Last 3 Encounters:   12/09/24 79.7 kg (175 lb 12.8 oz)   10/17/24 82.5 kg (181 lb 14.4 oz)   10/11/24 80.7 kg (178 lb)     BP Readings from Last 3 Encounters:   12/09/24 132/62   10/17/24 130/76   10/11/24 121/76       Health Maintenance   Topic Date Due    COVID-19 Vaccine (1 - 2024-25 season) Never done    INFLUENZA VACCINE  03/31/2025 (Originally 7/1/2024)    Pneumococcal Vaccine 0-64 (1 of 2 - PCV) 12/09/2025 (Originally " 2/16/2004)    BMI FOLLOWUP  10/11/2025    ANNUAL PHYSICAL  10/17/2025    TDAP/TD VACCINES (3 - Td or Tdap) 04/11/2033    HEPATITIS C SCREENING  Completed       Physical Exam  HENT:      Head: Normocephalic and atraumatic.      Nose: Nose normal.      Mouth/Throat:      Mouth: Mucous membranes are moist.   Eyes:      Extraocular Movements: Extraocular movements intact.      Conjunctiva/sclera: Conjunctivae normal.   Cardiovascular:      Rate and Rhythm: Normal rate and regular rhythm.      Heart sounds: Murmur (Systolic) heard.      No friction rub. No gallop.   Pulmonary:      Effort: No respiratory distress.      Breath sounds: No wheezing, rhonchi or rales.   Abdominal:      General: Abdomen is flat. There is no distension.   Musculoskeletal:         General: No swelling.      Cervical back: Neck supple.   Skin:     General: Skin is warm and dry.   Neurological:      General: No focal deficit present.      Mental Status: He is alert and oriented to person, place, and time.   Psychiatric:         Mood and Affect: Mood normal.         Behavior: Behavior normal.         Thought Content: Thought content normal.         Judgment: Judgment normal.          Physical Exam      Result Review :  The following data was reviewed by: Severo Cullen DO on 12/09/2024:    XR Chest 1 View    Result Date: 10/4/2024  Impression: No acute cardiopulmonary abnormality. Electronically Signed: Edna Horan MD  10/4/2024 4:47 PM EDT  Workstation ID: [id number]       Results  Testing  Stress test was normal.       Procedures          Diagnoses and all orders for this visit:    1. Tobacco use disorder (Primary)  -     nicotine polacrilex (NICORETTE) 4 MG gum; Chew 1 each As Needed for Smoking Cessation.  Dispense: 50 each; Refill: 2    2. Palpitations    3. Systolic murmur         Assessment & Plan  1. Smoking cessation.  He continues to smoke and has not picked up the nicotine patches previously recommended. He is currently at a 10 on a  scale of 0-10 for readiness to quit smoking. He reports that smoking affects his breathing and he gets out of breath quickly during physical activity. He was prescribed nicotine gum and instructed on its proper use. He was also advised to use a straw or toothpick as a distraction from smoking. The potential side effects, risks, and benefits of smoking cessation were discussed, including the prevention of COPD, heart disease, and erectile dysfunction. If the gum does not help with cravings, he may try the nicotine patches instead.    2.  Palpitations.  He has a heart monitor scheduled for next month. He completed a stress test recently, which showed no issues.    3.  Health maintenance  Patient is a candidate for the pneumonia vaccine based on his smoking history.  We discussed in detail risks and benefits.  Patient will hold off at this time we will focus on quitting smoking.    Follow-up  Return in 3 months for follow up.              FOLLOW UP  Return in about 3 months (around 3/9/2025) for smoking cessation.  Patient was given instructions and counseling regarding his condition or for health maintenance advice. Please see specific information pulled into the AVS if appropriate.     Patient or patient representative verbalized consent for the use of Ambient Listening during the visit with  Severo Cullen DO for chart documentation. 12/9/2024  10:20 EST    Severo Cullen DO  12/09/24  10:20 EST    CURRENT & DISCONTINUED MEDICATIONS  Current Outpatient Medications   Medication Instructions    nicotine (EQ Nicotine) 14 MG/24HR patch 1 patch, Transdermal, Every 24 Hours    nicotine polacrilex (NICORETTE) 4 mg, Mouth/Throat, As Needed       There are no discontinued medications.

## 2024-12-16 ENCOUNTER — HOSPITAL ENCOUNTER (OUTPATIENT)
Dept: CARDIOLOGY | Facility: HOSPITAL | Age: 24
Discharge: HOME | End: 2024-12-16
Admitting: INTERNAL MEDICINE
Payer: COMMERCIAL

## 2024-12-16 DIAGNOSIS — R00.2 PALPITATIONS: ICD-10-CM

## 2024-12-16 PROCEDURE — 93242 EXT ECG>48HR<7D RECORDING: CPT

## 2025-06-18 ENCOUNTER — RESULTS FOLLOW-UP (OUTPATIENT)
Dept: CARDIOLOGY | Age: 25
End: 2025-06-18
Payer: COMMERCIAL

## 2025-06-19 NOTE — TELEPHONE ENCOUNTER
FABY patient. Patient reports the heart monitor is still in his possession and was compliant with wearing the monitor. Patient states he will return the heart monitor so results can be reviewed.

## 2025-06-28 LAB
CV ZIO BASELINE AVG BPM: 66 BPM
CV ZIO BASELINE BPM HIGH: 158 BPM
CV ZIO BASELINE BPM LOW: 37 BPM
CV ZIO DEVICE ANALYSIS TIME: NORMAL
CV ZIO ECT SVE COUNT: 60 EPISODES
CV ZIO ECT SVE CPLT COUNT: 0 EPISODES
CV ZIO ECT SVE CPLT FREQ: 0
CV ZIO ECT SVE FREQ: NORMAL
CV ZIO ECT SVE TPLT COUNT: 0 EPISODES
CV ZIO ECT SVE TPLT FREQ: 0
CV ZIO ECT VE COUNT: 0 EPISODES
CV ZIO ECT VE CPLT COUNT: 0 EPISODES
CV ZIO ECT VE CPLT FREQ: 0
CV ZIO ECT VE FREQ: 0
CV ZIO ECT VE TPLT COUNT: 0 EPISODES
CV ZIO ECT VE TPLT FREQ: 0
CV ZIO ECTOPIC SVE COUPLET RAW PERCENT: 0 %
CV ZIO ECTOPIC SVE ISOLATED PERCENT: 0.03 %
CV ZIO ECTOPIC SVE TRIPLET RAW PERCENT: 0 %
CV ZIO ECTOPIC VE COUPLET RAW PERCENT: 0 %
CV ZIO ECTOPIC VE ISOLATED PERCENT: 0 %
CV ZIO ECTOPIC VE TRIPLET RAW PERCENT: 0 %
CV ZIO ENROLLMENT END: NORMAL
CV ZIO ENROLLMENT START: NORMAL
CV ZIO PATIENT EVENTS DIARIES: 0
CV ZIO PATIENT EVENTS TRIGGERS: 1
CV ZIO PAUSE COUNT: 0
CV ZIO PRESCRIPTION STATUS: NORMAL
CV ZIO SVT COUNT: 0
CV ZIO TOTAL  ENROLLMENT PERIOD: NORMAL
CV ZIO VT COUNT: 0
